# Patient Record
Sex: FEMALE | URBAN - METROPOLITAN AREA
[De-identification: names, ages, dates, MRNs, and addresses within clinical notes are randomized per-mention and may not be internally consistent; named-entity substitution may affect disease eponyms.]

---

## 2022-09-20 ENCOUNTER — OFFICE VISIT (OUTPATIENT)
Dept: URBAN - METROPOLITAN AREA CLINIC 91 | Facility: CLINIC | Age: 77
End: 2022-09-20
Payer: MEDICARE

## 2022-09-20 DIAGNOSIS — L23.9 ALLERGIC CONTACT DERMATITIS: ICD-10-CM

## 2022-09-20 DIAGNOSIS — H52.4 PRESBYOPIA: ICD-10-CM

## 2022-09-20 DIAGNOSIS — H11.153 PINGUECULA, BILATERAL: ICD-10-CM

## 2022-09-20 DIAGNOSIS — H04.123 DRY EYE SYNDROME OF BILATERAL LACRIMAL GLANDS: ICD-10-CM

## 2022-09-20 DIAGNOSIS — H26.493 OTHER SECONDARY CATARACT, BILATERAL: Primary | ICD-10-CM

## 2022-09-20 PROCEDURE — 99214 OFFICE O/P EST MOD 30 MIN: CPT | Performed by: SPECIALIST

## 2022-09-20 RX ORDER — LOTEPREDNOL ETABONATE 5 MG/G
0.5 % OINTMENT OPHTHALMIC
Qty: 1 | Refills: 0 | Status: INACTIVE
Start: 2022-09-20 | End: 2022-09-29

## 2022-09-20 ASSESSMENT — VISUAL ACUITY
OS: 20/25
OD: 20/25

## 2022-09-20 ASSESSMENT — INTRAOCULAR PRESSURE
OD: 15
OS: 16

## 2022-09-20 NOTE — IMPRESSION/PLAN
Impression: Allergic contact dermatitis: L23.9. Plan: Allergic contact dermatitis noted on exam today. Pt to start Lotemax ashlie BID x 2 weeks than d/c.